# Patient Record
Sex: MALE | Race: WHITE | Employment: FULL TIME | URBAN - METROPOLITAN AREA
[De-identification: names, ages, dates, MRNs, and addresses within clinical notes are randomized per-mention and may not be internally consistent; named-entity substitution may affect disease eponyms.]

---

## 2024-03-01 ENCOUNTER — MED REC SCAN ONLY (OUTPATIENT)
Dept: ORTHOPEDICS CLINIC | Facility: CLINIC | Age: 29
End: 2024-03-01

## 2024-03-07 ENCOUNTER — TELEPHONE (OUTPATIENT)
Dept: ORTHOPEDICS CLINIC | Facility: CLINIC | Age: 29
End: 2024-03-07

## 2024-03-07 ENCOUNTER — HOSPITAL ENCOUNTER (OUTPATIENT)
Dept: GENERAL RADIOLOGY | Age: 29
Discharge: HOME | End: 2024-03-07
Attending: PHYSICIAN ASSISTANT
Payer: COMMERCIAL

## 2024-03-07 ENCOUNTER — OFFICE VISIT (OUTPATIENT)
Dept: ORTHOPEDICS CLINIC | Facility: CLINIC | Age: 29
End: 2024-03-07
Payer: COMMERCIAL

## 2024-03-07 VITALS — BODY MASS INDEX: 23.54 KG/M2 | HEIGHT: 67 IN | WEIGHT: 150 LBS

## 2024-03-07 DIAGNOSIS — M54.50 LOW BACK PAIN, UNSPECIFIED BACK PAIN LATERALITY, UNSPECIFIED CHRONICITY, UNSPECIFIED WHETHER SCIATICA PRESENT: Primary | ICD-10-CM

## 2024-03-07 DIAGNOSIS — M43.06 PARS DEFECT OF LUMBAR SPINE: ICD-10-CM

## 2024-03-07 DIAGNOSIS — M54.50 LOW BACK PAIN, UNSPECIFIED BACK PAIN LATERALITY, UNSPECIFIED CHRONICITY, UNSPECIFIED WHETHER SCIATICA PRESENT: ICD-10-CM

## 2024-03-07 DIAGNOSIS — T14.8XXA AVULSION INJURY: Primary | ICD-10-CM

## 2024-03-07 DIAGNOSIS — M25.551 RIGHT HIP PAIN: ICD-10-CM

## 2024-03-07 DIAGNOSIS — M76.31 IT BAND SYNDROME, RIGHT: ICD-10-CM

## 2024-03-07 DIAGNOSIS — M54.16 LUMBAR RADICULOPATHY: Primary | ICD-10-CM

## 2024-03-07 PROCEDURE — 99204 OFFICE O/P NEW MOD 45 MIN: CPT | Performed by: PHYSICIAN ASSISTANT

## 2024-03-07 PROCEDURE — 72114 X-RAY EXAM L-S SPINE BENDING: CPT | Performed by: PHYSICIAN ASSISTANT

## 2024-03-07 PROCEDURE — 3008F BODY MASS INDEX DOCD: CPT | Performed by: PHYSICIAN ASSISTANT

## 2024-03-07 PROCEDURE — 73502 X-RAY EXAM HIP UNI 2-3 VIEWS: CPT | Performed by: PHYSICIAN ASSISTANT

## 2024-03-07 RX ORDER — METHYLPREDNISOLONE 4 MG/1
TABLET ORAL
Qty: 1 EACH | Refills: 0 | Status: SHIPPED | OUTPATIENT
Start: 2024-03-07

## 2024-03-07 NOTE — H&P
[facility] - ORTHOPEDICS  [address] [place] [address]  [phone]     NEW PATIENT VISIT - HISTORY AND PHYSICAL EXAMINATION     Name: Edmundo Oliveira   MRN: [id number]  Date: 3/7/2024     CC: Right hip pain.    REFERRED BY: Valentine Wharton MD    HPI:   Edmundo Oliveira is a very pleasant 29 year old male who presents today for evaluation, consultation, and management of RIGHT HIP pain after a ski trip - for the last two weeks, worse 10 days ago after playing soccer. He describes pain at the lateral aspect of his hip and with some numbness extending to his lateral leg to the knee. No radicular symptoms to lower extremity. He works in internal [employer] and was referred by [employer]. He otherwise enjoys soccer and snowboarding.       PMH:   History reviewed. No pertinent past medical history.    PAST SURGICAL HX:  History reviewed. No pertinent surgical history.    FAMILY HX:  Family History   Problem Relation Age of Onset    Hypertension Father     Heart Disorder Sister         Born heart defect    Breast Cancer Maternal Grandmother     Stroke Maternal Grandmother     Cancer Maternal Grandfather         Unknown        ALLERGIES:  Patient has no known allergies.    MEDICATIONS:   Current Outpatient Medications   Medication Sig Dispense Refill    methylPREDNISolone (MEDROL) 4 MG Oral Tablet Therapy Pack As directed. 1 each 0       ROS: A comprehensive 14 point review of systems was performed and was negative aside from the aforementioned per history of present illness.    SOCIAL HX:  Social History     Occupational History    Not on file   Tobacco Use    Smoking status: Never    Smokeless tobacco: Not on file   Substance and Sexual Activity    Alcohol use: Yes     Comment: occasional    Drug use: Yes     Types: Cannabis    Sexual activity: Not on file       PE:   Vitals:    03/07/24 1035   Weight: 150 lb (68 kg)   Height: 5' 7\" (1.702 m)     Estimated body mass index is  23.49 kg/m² as calculated from the following:    Height as of this encounter: 5' 7\" (1.702 m).    Weight as of this encounter: 150 lb (68 kg).    Physical Exam  Constitutional:       Appearance: Normal appearance.   HENT:      Head: Normocephalic and atraumatic.   Eyes:      Extraocular Movements: Extraocular movements intact.   Neck:      Musculoskeletal: Normal range of motion and neck supple.   Cardiovascular:      Pulses: Normal pulses.   Pulmonary:      Effort: Pulmonary effort is normal. No respiratory distress.   Abdominal:      General: There is no distension.   Skin:     General: Skin is warm.      Capillary Refill: Capillary refill takes less than 2 seconds.      Findings: No bruising.   Neurological:      General: No focal deficit present.      Mental Status: Alert.   Psychiatric:         Mood and Affect: Mood normal.     Examination of the right hip demonstrates:     On physical examination patient is alert and oriented x3, in no apparent or acute distress.   Skin is intact, warm and dry.     The patient demonstrates a Antalgic gait.  Patient has extension to 0 degrees, flexion to 120 degrees.   The patient has 30 degrees  of internal rotation, and 25 degrees of external rotation.     On provocative examination, there is a Negative subspine, Negative trochanteric pain sign, and Negative FADIR and Negative ROSIO testing.     There is a Negative log roll, circumduction clunk.     Negative Brijesh test.     The patient has 4+/5 strength with hip flexion, 4+/5 with abduction and adduction bilaterally.   The patient has no tenderness over the ASIS, no tenderness at the hip flexor, no tenderness at the iliac crest, no at the ischium, no no at the greater trochanter, no at the SI joint.     There is no tenderness at over the adductor, pubic tubercle, or pain with resisted adduction.       No obvious peripheral edema noted.   Distal neurovascular exam demonstrates normal perfusion, intact sensation to light touch  and preserved strength.     Examination of the contralateral hip demonstrates:  No significant atrophy, swelling or effusion. Full range of motion. Neurovascularly intact distally.    Radiographic Examination/Diagnostics:  I personally viewed, independently interpreted and radiology report was reviewed.      XR LUMBAR SPINE COMPLETE W/FLEX + EXT (CPT=72114)    Result Date: 3/7/2024  PROCEDURE:  XR LUMBAR SPINE COMPLETE W/FLEX + EXT (CPT=72114)  TECHNIQUE:  AP, lateral, obliques, coned down view, and flexion/extension views of the spine were obtained.  COMPARISON:  None.  INDICATIONS:  M54.50 Low back pain, unspecified back pain laterality, unspecified chronicity, unspecified whether sciatica present  PATIENT STATED HISTORY: (As transcribed by Technologist)  Ortho consult. Patient states low back pain with right sided sciatica for 10 days.    FINDINGS:  Bilateral L5 pars defect, appearing more notable on the right side, but present bilaterally, without signs of subluxation L5.  No other levels show subluxation.  No compression, fracture, scoliosis.  No destructive lytic lesions of the bone.  Flexion extension view show no signs of developing subluxation upon these maneuvers.            CONCLUSION:  L5 pars defect without subluxation.   LOCATION:  [facility]   Dictated by (CST): Ismael Edge MD on 3/07/2024 at 10:32 AM     Finalized by (CST): Ismael Edge MD on 3/07/2024 at 10:33 AM         IMPRESSION: Edmundo Oliveira is a 29 year old male who presents with evidence of lumbar radiculopathy/pars defect, and right IT band syndrome.     PLAN:   We had a detailed discussion outlining the etiology, anatomy, pathophysiology, and natural history of the patient's findings. Imaging was reviewed in detail and correlated to a 3-dimensional model of the patient's pathology.     We reviewed the treatment of this disease condition.  In light of the acute traumatic incident, loss of normal function, and  failure to progress  conservatively we recommend an MRI to evaluate the integrity of the patient's lumbar disk. The patient will follow up after imaging.     External records were also reviewed for pertinent historical findings contributing to the patients undiagnosed new problem with uncertain prognosis.     The patient had the opportunity to ask questions, and all questions were answered appropriately.     FOLLOW-UP:  Results can be discussed via mychart.             Jc Easton [facility], PA-C Orthopedic Surgery / Sports Medicine Specialist  [facility]  [address] [place] [email or web address]  t: [phone]  o: [phone]  f: [phone]    This note was dictated using Dragon software.  While it was briefly proofread prior to completion, some grammatical, spelling, and word choice errors due to dictation may still occur.

## 2024-03-12 ENCOUNTER — PATIENT MESSAGE (OUTPATIENT)
Dept: ORTHOPEDICS CLINIC | Facility: CLINIC | Age: 29
End: 2024-03-12

## 2024-03-12 ENCOUNTER — HOSPITAL ENCOUNTER (OUTPATIENT)
Dept: MRI IMAGING | Age: 29
Discharge: HOME | End: 2024-03-12
Attending: PHYSICIAN ASSISTANT
Payer: COMMERCIAL

## 2024-03-12 DIAGNOSIS — M54.16 LUMBAR RADICULOPATHY: ICD-10-CM

## 2024-03-12 PROCEDURE — 72148 MRI LUMBAR SPINE W/O DYE: CPT | Performed by: PHYSICIAN ASSISTANT

## 2024-03-13 NOTE — TELEPHONE ENCOUNTER
From: Edmundo Oliveira  To: Jc Easton  Sent: 3/12/2024 4:53 PM CDT  Subject: MRI SPINE LUMBAR (WITHOUT CONTRAST) (NTW=91032)    Hi DR. Jc Easton,    Can you please help give me a call to discuss the results? I took a read through the results and I am a bit confused. Just looking to confirm results, what you recommend, timelines, and any suggested medications for current pain.    Thanks,  Edmundo Oliveira  [phone]

## 2024-03-15 ENCOUNTER — OFFICE VISIT (OUTPATIENT)
Dept: ORTHOPEDICS CLINIC | Facility: CLINIC | Age: 29
End: 2024-03-15
Payer: COMMERCIAL

## 2024-03-15 VITALS — HEIGHT: 67 IN | WEIGHT: 150 LBS | BODY MASS INDEX: 23.54 KG/M2

## 2024-03-15 DIAGNOSIS — M51.26 LUMBAR DISC HERNIATION: Primary | ICD-10-CM

## 2024-03-15 PROCEDURE — 99204 OFFICE O/P NEW MOD 45 MIN: CPT | Performed by: STUDENT IN AN ORGANIZED HEALTH CARE EDUCATION/TRAINING PROGRAM

## 2024-03-15 PROCEDURE — 3008F BODY MASS INDEX DOCD: CPT | Performed by: STUDENT IN AN ORGANIZED HEALTH CARE EDUCATION/TRAINING PROGRAM

## 2024-03-15 RX ORDER — MELOXICAM 15 MG/1
15 TABLET ORAL DAILY
Qty: 30 TABLET | Refills: 0 | Status: SHIPPED | OUTPATIENT
Start: 2024-03-15

## 2024-03-15 NOTE — H&P
[facility] - ORTHOPEDICS  [address], [address], [place] [address]  [address] [place] [address]  [phone]     NEW PATIENT VISIT - HISTORY AND PHYSICAL EXAMINATION     Name: Edmundo Oliveira   MRN: [id number]  Date: 03/15/24       CC: back and leg pain    REFERRED BY: Valentine Wharton MD    HPI:   Edmundo Oliveira is a very pleasant 29 year old male who presents today for evaluation of back and leg pain. The distribution of symptoms are: 80% backpain and 20% leg pain. The symptoms began 2 week(s) ago without any significant injury. Since the onset, the symptoms have been improving. The patient reports  numbness in his right thigh and no weakness.  The symptom characteristics are as follows: Patient is a healthy 29-year-old male presenting with low back pain, associated with numbness and tingling of the right thigh patient has tried steroids.  Overall symptoms are manageable    Prior spine surgery: none.    Bowel and bladder symptoms: absent.    The patient has not had issues with balance and/or hand dexterity problems such as changes in penmanship or the use of buttons or zippers.    Treatment up to this time has included:    Evaluation: other [facility] provider  NSAIDS: have not been prescribed  Narcotic use: None  Physical therapy: None  Spinal injections: None  Others:       PMH:   History reviewed. No pertinent past medical history.    PAST SURGICAL HX:  History reviewed. No pertinent surgical history.    FAMILY HX:  Family History   Problem Relation Age of Onset    Hypertension Father     Heart Disorder Sister         Born heart defect    Breast Cancer Maternal Grandmother     Stroke Maternal Grandmother     Cancer Maternal Grandfather         Unknown        ALLERGIES:  Patient has no known allergies.    MEDICATIONS:   Current Outpatient Medications   Medication Sig Dispense Refill    Meloxicam 15 MG Oral Tab Take 1 tablet (15 mg total) by mouth daily. 30 tablet 0     methylPREDNISolone (MEDROL) 4 MG Oral Tablet Therapy Pack As directed. (Patient not taking: Reported on 3/15/2024) 1 each 0       ROS: A comprehensive 14 point review of systems was performed and was negative aside from the aforementioned per history of present illness.    SOCIAL HX:  Social History     Tobacco Use    Smoking status: Never    Smokeless tobacco: Not on file   Substance Use Topics    Alcohol use: Yes     Comment: occasional         PE:   Vitals:    03/15/24 0836   Weight: 150 lb (68 kg)   Height: 5' 7\" (1.702 m)     Estimated body mass index is 23.49 kg/m² as calculated from the following:    Height as of this encounter: 5' 7\" (1.702 m).    Weight as of this encounter: 150 lb (68 kg).    Physical Exam  Constitutional:       Appearance: Normal appearance.   HENT:      Head: Normocephalic and atraumatic.   Eyes:      Extraocular Movements: Extraocular movements intact.   Cardiovascular:      Pulses: Normal pulses. Skin warm and well perfused.  Pulmonary:      Effort: Pulmonary effort is normal. No respiratory distress.   Skin:     General: Skin is warm.   Psychiatric:         Mood and Affect: Mood normal.     Spine Exam:    Normal gait without difficulty  Able to heel, toe, tandem gait without difficulty  Level shoulders and hips in even stance    Restricted L-spine ROM    No tenderness to palpation of L-spine    Straight leg raise test: negative    Sustained clonus: negative    LE Strength: 5/5 IP QUAD TA EHL GSC  LE Sensation: normal in L2-S1 distribution  LE reflexes: normal    Radiographic Examination/Diagnostics:  XR and MRI personally viewed, independently interpreted and radiology report was reviewed.  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  We had a detailed discussion outlining the etiology, anatomy, pathophysiology, and natural history of spondylolysis and disc herniation. The typical management of this condition may include lifestyle modification, NSAIDs, physical therapy, oral steroids, epidural injections, neuromodulatory medications, and sometimes pain medications.  Based on our discussion today we would like to have the patient initiate our recommendations for continued conservative therapy in the treatment of their condition noted in the assessment section.     -Referred to Physical Therapy: home exercise program, aerobic exercises, core strengthening and conditioning, possible manipulative therapy,  and modalities as indicated  -Provided HEP  -Prescribed Meloxicam    FOLLOW-UP:  We will see him back in follow-up in one month, or sooner if any problems arise. Patient understands and agrees with plan.      Raj Menon MD  Orthopedic Spine Surgeon  [facility]   [address] [place] [address].[email or web address]  [email or web address]  t: [phone]   f: [phone]        This note was dictated using Dragon software.  While it was briefly proofread prior to completion, some grammatical, spelling, and word choice errors due to dictation may still occur.

## 2024-04-17 ENCOUNTER — TELEPHONE (OUTPATIENT)
Dept: ORTHOPEDICS CLINIC | Facility: CLINIC | Age: 29
End: 2024-04-17

## 2024-04-17 NOTE — TELEPHONE ENCOUNTER
Pt is sched to see Dr. Menon for stress fracture. Please advise if imaging is needed.  Future Appointments   Date Time Provider Department Center   5/3/2024  7:00 AM Raj Menon MD [facility]

## 2024-05-03 ENCOUNTER — OFFICE VISIT (OUTPATIENT)
Dept: ORTHOPEDICS CLINIC | Facility: CLINIC | Age: 29
End: 2024-05-03
Payer: COMMERCIAL

## 2024-05-03 DIAGNOSIS — M51.26 LUMBAR DISC HERNIATION: Primary | ICD-10-CM

## 2024-05-03 PROCEDURE — 99213 OFFICE O/P EST LOW 20 MIN: CPT | Performed by: STUDENT IN AN ORGANIZED HEALTH CARE EDUCATION/TRAINING PROGRAM

## 2024-05-03 NOTE — PROGRESS NOTES
[facility] - ORTHOPEDICS  [address] [place] [address], [address], [place] [address]  [address] [place] [address]  [phone]     FOLLOW-UP PATIENT VISIT    Name: Edmundo Oliveira   MRN: [id number]  Date: 5/3/2024     CC: back and leg pain       INTERVAL HISTORY:   Edmundo Oliveira is a 29 year old male  follow-up patient whom I have been treating conservatively. Patient returns today for reevaluation of lumbar disc herniation.     Patient was referred to physical therapy and prescribed anti-inflammatories.  Pain has nearly resolved.  Patient is doing well without activity restrictions.  He plans to return to soccer as well as golf later this year.    Bowel and bladder symptoms: absent.    The patient has not had issues with balance and/or hand dexterity problems such as changes in penmanship or the use of buttons or zippers.    We have tried the following interventions thus far: PT, NSAIDs    ROS: No fever/chills or other constitutional issues.    PE:   There were no vitals filed for this visit.  Estimated body mass index is 23.49 kg/m² as calculated from the following:    Height as of 3/15/24: 5' 7\" (1.702 m).    Weight as of 3/15/24: 150 lb (68 kg).    On physical examination, he is awake, alert and oriented x 3 and in no acute distress. Mood, affect and language are normal. He appears well developed and well nourished.  He walks without a nonantalgic, nonmyelopathic, non-Trendelenburg gait. Motor strength testing of the lower extremities shows 5/5 strength in hip flexors, knee extensors, ankle dorsiflexors, toe extensor, and gastroc-soleus complex.   Sensation is intact to light touch L2-S1 distributions bilaterally. Reflexes normal.     IMPRESSION: Edmundo Oliveira is a 29 year old male with lumbar disc herniation, responding well to medical treatment    PLAN:   - Continue home exercises  - Follow up as needed    I spent 20 minutes in preparation to see the patient,  counseling/education of relevant pathology, discussing imaging results, ordering medication/therapy intervention, and care coordination.        Raj Menon MD  Orthopedic Spine Surgeon  [facility]   [address] [place] [address], [address].[email or web address]  [email or web address]  t: [phone]   f: [phone]        This note was dictated using Dragon software.  While it was briefly proofread prior to completion, some grammatical, spelling, and word choice errors due to dictation may still occur.